# Patient Record
Sex: MALE | Race: WHITE | Employment: FULL TIME | URBAN - METROPOLITAN AREA
[De-identification: names, ages, dates, MRNs, and addresses within clinical notes are randomized per-mention and may not be internally consistent; named-entity substitution may affect disease eponyms.]

---

## 2017-02-22 ENCOUNTER — APPOINTMENT (OUTPATIENT)
Dept: GENERAL RADIOLOGY | Age: 27
End: 2017-02-22
Attending: NURSE PRACTITIONER
Payer: OTHER MISCELLANEOUS

## 2017-02-22 ENCOUNTER — HOSPITAL ENCOUNTER (EMERGENCY)
Age: 27
Discharge: HOME | End: 2017-02-22
Attending: EMERGENCY MEDICINE
Payer: OTHER MISCELLANEOUS

## 2017-02-22 VITALS
BODY MASS INDEX: 37.1 KG/M2 | WEIGHT: 265 LBS | SYSTOLIC BLOOD PRESSURE: 151 MMHG | HEIGHT: 71 IN | RESPIRATION RATE: 18 BRPM | HEART RATE: 77 BPM | TEMPERATURE: 97.8 F | DIASTOLIC BLOOD PRESSURE: 78 MMHG | OXYGEN SATURATION: 100 %

## 2017-02-22 DIAGNOSIS — S61.452A DOG BITE OF HAND, LEFT, INITIAL ENCOUNTER: Primary | ICD-10-CM
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
## 2017-02-22 PROCEDURE — 73130 X-RAY EXAM OF HAND: CPT

## 2017-02-22 PROCEDURE — 74011250637 HC RX REV CODE- 250/637: Performed by: NURSE PRACTITIONER

## 2017-02-22 PROCEDURE — 77030018836 HC SOL IRR NACL ICUM -A

## 2017-02-22 PROCEDURE — 73110 X-RAY EXAM OF WRIST: CPT

## 2017-02-22 PROCEDURE — 99283 EMERGENCY DEPT VISIT LOW MDM: CPT

## 2017-02-22 RX ORDER — AMOXICILLIN AND CLAVULANATE POTASSIUM 875; 125 MG/1; MG/1
1 TABLET, FILM COATED ORAL 2 TIMES DAILY
Qty: 14 TAB | Refills: 0 | Status: SHIPPED | OUTPATIENT
Start: 2017-02-22 | End: 2017-03-01

## 2017-02-22 RX ADMIN — NEOMYCIN AND POLYMYXIN B SULFATES AND BACITRACIN ZINC 1 PACKET: 400; 3.5; 5 OINTMENT TOPICAL at 12:03

## 2017-02-22 NOTE — DISCHARGE INSTRUCTIONS
Animal Bites: Care Instructions  Your Care Instructions  After an animal bite, the biggest concern is infection. The chance of infection depends on the type of animal that bit you, where on your body you were bitten, and your general health. Many animal bites are not closed with stitches, because this can increase the chance of infection. Your bite may take as little as 7 days or as long as several months to heal, depending on how bad it is. Taking good care of your wound at home will help it heal and reduce your chance of infection. The doctor has checked you carefully, but problems can develop later. If you notice any problems or new symptoms, get medical treatment right away. Follow-up care is a key part of your treatment and safety. Be sure to make and go to all appointments, and call your doctor if you are having problems. It's also a good idea to know your test results and keep a list of the medicines you take. How can you care for yourself at home? · If your doctor told you how to care for your wound, follow your doctor's instructions. If you did not get instructions, follow this general advice:  ¨ After 24 to 48 hours, gently wash the wound with clean water 2 times a day. Do not scrub or soak the wound. Don't use hydrogen peroxide or alcohol, which can slow healing. ¨ You may cover the wound with a thin layer of petroleum jelly, such as Vaseline, and a nonstick bandage. ¨ Apply more petroleum jelly and replace the bandage as needed. · After you shower, gently dry the wound with a clean towel. · If your doctor has closed the wound, cover the bandage with a plastic bag before you take a shower. · A small amount of skin redness and swelling around the wound edges and the stitches or staples is normal. Your wound may itch or feel irritated. Do not scratch or rub the wound.   · Ask your doctor if you can take an over-the-counter pain medicine, such as acetaminophen (Tylenol), ibuprofen (Advil, Motrin), or naproxen (Aleve). Read and follow all instructions on the label. · Do not take two or more pain medicines at the same time unless the doctor told you to. Many pain medicines have acetaminophen, which is Tylenol. Too much acetaminophen (Tylenol) can be harmful. · If your bite puts you at risk for rabies, you will get a series of shots over the next few weeks to prevent rabies. Your doctor will tell you when to get the shots. It is very important that you get the full cycle of shots. Follow your doctor's instructions exactly. · You may need a tetanus shot if you have not received one in the last 5 years. · If your doctor prescribed antibiotics, take them as directed. Do not stop taking them just because you feel better. You need to take the full course of antibiotics. When should you call for help? Call your doctor now or seek immediate medical care if:  · The skin near the bite turns cold or pale or it changes color. · You lose feeling in the area near the bite, or it feels numb or tingly. · You have trouble moving a limb near the bite. · You have symptoms of infection, such as:  ¨ Increased pain, swelling, warmth, or redness near the wound. ¨ Red streaks leading from the wound. ¨ Pus draining from the wound. ¨ A fever. · Blood soaks through the bandage. Oozing small amounts of blood is normal.  · Your pain is getting worse. Watch closely for changes in your health, and be sure to contact your doctor if you are not getting better as expected. Where can you learn more? Go to http://shalom-russell.info/. Enter H996 in the search box to learn more about \"Animal Bites: Care Instructions. \"  Current as of: May 27, 2016  Content Version: 11.1  © 7373-2120 [employer]. Care instructions adapted under license by [employer] (which disclaims liability or warranty for this information).  If you have questions about a medical condition or this instruction, always ask your healthcare professional. [place] [address] any warranty or liability for your use of this information. We hope that we have addressed all of your medical concerns. The examination and treatment you received in the Emergency Department were for an emergent problem and were not intended as complete care. It is important that you follow up with your healthcare provider(s) for ongoing care. If your symptoms worsen or do not improve as expected, and you are unable to reach your usual health care provider(s), you should return to the Emergency Department. Today's healthcare is undergoing tremendous change, and patient satisfaction surveys are one of the many tools to assess the quality of medical care. You may receive a survey from the [employer] regarding your experience in the Emergency Department. I hope that your experience has been completely positive, particularly the medical care that I provided. As such, please participate in the survey; anything less than excellent does not meet my expectations or intentions. [facility] and [address] [place] [address] participate in nationally recognized quality of care measures. If your blood pressure is greater than 120/80, as reported below, we urge that you seek medical care to address the potential of high blood pressure, commonly known as hypertension. Hypertension can be hereditary or can be caused by certain medical conditions, pain, stress, or \"white coat syndrome. \"       Please make an appointment with your health care provider(s) for follow up of your Emergency Department visit. VITALS:   Patient Vitals for the past 8 hrs:   Temp Pulse Resp BP SpO2   02/22/17 1057 97.8 °F (36.6 °C) 77 18 151/78 100 %          Thank you for allowing us to provide you with medical care today. We realize that you have many choices for your emergency care needs.   Please choose us in the future for any continued health care needs. Rulon  Lawerancisreal Sandy, [address]: [phone]            No results found for this or any previous visit (from the past 24 hour(s)). Xr Wrist Lt Ap/lat/obl Min 3v    Result Date: 2/22/2017  INDICATION:  dog bite today in some area towards posterior wrist. Exam: AP, lateral, oblique views of the left hand and left wrist. FINDINGS: There is no acute fracture or dislocation. The articulations are normal. Bones are well mineralized. No radiodense foreign body is visualized. IMPRESSION: No acute fracture or dislocation. Xr Hand Lt Min 3 V    Result Date: 2/22/2017  INDICATION:  dog bite today in some area towards posterior wrist. Exam: AP, lateral, oblique views of the left hand and left wrist. FINDINGS: There is no acute fracture or dislocation. The articulations are normal. Bones are well mineralized. No radiodense foreign body is visualized. IMPRESSION: No acute fracture or dislocation.

## 2017-02-22 NOTE — ED PROVIDER NOTES
HPI Comments: 30 yo M with hx of HTN presents ambulatory to ED accompanied by wife after being bit by his own dog this am which was trying to lunge for a ball. Pt has puncture marks to L thumb and pain to hand and wrist.  States arm feels tingly up to elbow. Denies weakness, loss of sensation. Tdap is UTD (3 yrs ago per pt). Past Medical History:  No date: Hypertension    Social History    Marital status:              Spouse name:                       Years of education:                 Number of children:               Occupational History    None on file    Social History Main Topics    Smoking status: Light Tobacco Smoker                                                         Packs/day: 0.00      Years: 0.00        Smokeless status: Never Used                        Alcohol use: Yes             Drug use: Not on file     Sexual activity: Not on file          Other Topics            Concern    None on file    Social History Narrative    None on file          Patient is a 31 y.o. male presenting with dog bite. Dog Bite    Pertinent negatives include no nausea, no headaches and no cough. Past Medical History:   Diagnosis Date    Hypertension        History reviewed. No pertinent surgical history. History reviewed. No pertinent family history. Social History     Social History    Marital status:      Spouse name: N/A    Number of children: N/A    Years of education: N/A     Occupational History    Not on file. Social History Main Topics    Smoking status: Light Tobacco Smoker    Smokeless tobacco: Never Used    Alcohol use Yes    Drug use: Not on file    Sexual activity: Not on file     Other Topics Concern    Not on file     Social History Narrative         ALLERGIES: Sulfa (sulfonamide antibiotics)    Review of Systems   Constitutional: Negative for fever. HENT: Negative. Eyes: Negative for discharge. Respiratory: Negative for cough and shortness of breath. Cardiovascular: Negative for leg swelling. Gastrointestinal: Negative for nausea. Genitourinary: Negative. Musculoskeletal:        L hand/wrist pain   Skin: Negative for color change. Neurological: Negative for headaches. Psychiatric/Behavioral: Negative for confusion. Vitals:    02/22/17 1057   BP: 151/78   Pulse: 77   Resp: 18   Temp: 97.8 °F (36.6 °C)   SpO2: 100%   Weight: 120.2 kg (265 lb)   Height: 5' 11\" (1.803 m)            Physical Exam   Constitutional: He is oriented to person, place, and time. He appears well-developed and well-nourished. No distress. HENT:   Head: Normocephalic and atraumatic. Eyes: Conjunctivae and EOM are normal. Pupils are equal, round, and reactive to light. Neck: Normal range of motion. Neck supple. Cardiovascular: Normal rate, regular rhythm and normal heart sounds. Pulmonary/Chest: Effort normal and breath sounds normal.   Abdominal: Soft. Bowel sounds are normal.   Musculoskeletal: Normal range of motion. He exhibits tenderness. He exhibits no deformity. Tenderness to palpation of L hand and wrist at the site of dog bite. No obvious deformity, no decreased ROM or sensation. Cap refill less than 3 seconds. +2 DP on L. Neurological: He is alert and oriented to person, place, and time. Skin: Skin is warm and dry. He is not diaphoretic. See picture   Nursing note and vitals reviewed. MDM  Number of Diagnoses or Management Options  Dog bite of hand, left, initial encounter:   Diagnosis management comments: 32 yo M who was bit by his [place] PTA on the L hand. Puncture sites noted to L hand. + tenderness to palpation of L hand and wrist. Xrays ordered. Tdap UTD. Xrays negative for fx. Bacitracin and nonadherent dsg applied to wound. Augmentin prescribed. Recommended FU with PCP, return to ED for worsening sx.          Amount and/or Complexity of Data Reviewed  Tests in the radiology section of CPT®: ordered and reviewed  Discuss the patient with other providers: yes (Dr. Daisy Bo)    Patient Progress  Patient progress: stable    ED Course       Procedures

## 2017-02-22 NOTE — LETTER
[address] [facility] EMERGENCY DEPT 
[address] [facility] [address] [id number] 
[phone] Work/School Note Date: 2/22/2017 To Whom It May concern: 
 
Delores Chirinos was seen and treated today in the emergency room by the following provider(s): 
Attending Provider: Sterling Swift MD 
Nurse Practitioner: Tegan Valentin NP. Delores Chirinos may return to work on 02/23/2017. Sincerely, Tegan Valentin NP